# Patient Record
Sex: FEMALE | Race: ASIAN | NOT HISPANIC OR LATINO
[De-identification: names, ages, dates, MRNs, and addresses within clinical notes are randomized per-mention and may not be internally consistent; named-entity substitution may affect disease eponyms.]

---

## 2017-05-10 ENCOUNTER — APPOINTMENT (OUTPATIENT)
Dept: MAMMOGRAPHY | Facility: IMAGING CENTER | Age: 42
End: 2017-05-10

## 2017-05-10 ENCOUNTER — APPOINTMENT (OUTPATIENT)
Dept: ULTRASOUND IMAGING | Facility: IMAGING CENTER | Age: 42
End: 2017-05-10

## 2019-08-02 ENCOUNTER — EMERGENCY (EMERGENCY)
Facility: HOSPITAL | Age: 44
LOS: 1 days | Discharge: HOME | End: 2019-08-02
Attending: EMERGENCY MEDICINE | Admitting: EMERGENCY MEDICINE
Payer: COMMERCIAL

## 2019-08-02 VITALS
OXYGEN SATURATION: 100 % | SYSTOLIC BLOOD PRESSURE: 103 MMHG | RESPIRATION RATE: 17 BRPM | HEART RATE: 64 BPM | TEMPERATURE: 98 F | DIASTOLIC BLOOD PRESSURE: 71 MMHG

## 2019-08-02 VITALS
SYSTOLIC BLOOD PRESSURE: 129 MMHG | TEMPERATURE: 98 F | OXYGEN SATURATION: 100 % | RESPIRATION RATE: 18 BRPM | DIASTOLIC BLOOD PRESSURE: 67 MMHG | HEART RATE: 75 BPM

## 2019-08-02 LAB
APPEARANCE UR: CLEAR — SIGNIFICANT CHANGE UP
BILIRUB UR-MCNC: NEGATIVE — SIGNIFICANT CHANGE UP
BLOOD UR QL VISUAL: NEGATIVE — SIGNIFICANT CHANGE UP
COLOR SPEC: YELLOW — SIGNIFICANT CHANGE UP
GLUCOSE UR-MCNC: NEGATIVE — SIGNIFICANT CHANGE UP
KETONES UR-MCNC: NEGATIVE — SIGNIFICANT CHANGE UP
LEUKOCYTE ESTERASE UR-ACNC: NEGATIVE — SIGNIFICANT CHANGE UP
NITRITE UR-MCNC: NEGATIVE — SIGNIFICANT CHANGE UP
PH UR: 6.5 — SIGNIFICANT CHANGE UP (ref 5–8)
PROT UR-MCNC: NEGATIVE — SIGNIFICANT CHANGE UP
SP GR SPEC: 1.02 — SIGNIFICANT CHANGE UP (ref 1–1.04)
UROBILINOGEN FLD QL: NORMAL — SIGNIFICANT CHANGE UP

## 2019-08-02 PROCEDURE — 76830 TRANSVAGINAL US NON-OB: CPT | Mod: 26

## 2019-08-02 PROCEDURE — 99284 EMERGENCY DEPT VISIT MOD MDM: CPT

## 2019-08-02 NOTE — ED PROVIDER NOTE - PHYSICAL EXAMINATION
No CVA tenderness. No CVA tenderness.  GYN: mild uterine tenderness. no adnexal tenderness. scant clear/brown discharge. os closed. no cmt. Chaperoned by karen Do.

## 2019-08-02 NOTE — ED PROVIDER NOTE - CLINICAL SUMMARY MEDICAL DECISION MAKING FREE TEXT BOX
43 y/o F pt with PMHX of fibroids and ovarian cyst presents to the ED complaining of vaginal discomfort and intermittent pain for a week. Plan: lab pregnancy, UTI, check pelvic exam to r/o PID. Likely discomfort secondary to fibroid. will consider transvaginal US, and gyn followup.

## 2019-08-02 NOTE — ED ADULT TRIAGE NOTE - CHIEF COMPLAINT QUOTE
Complains of vaginal discharge and pelvic  and back pain. LMP 07/17/2019, reports vaginal discharge has not stopped since LMP. Pelvic and back pain x 1.5 weeks. Denies n/v fevers or chills, chest pain. Hx uterine fibroids and cysts.

## 2019-08-02 NOTE — ED ADULT NURSE NOTE - OBJECTIVE STATEMENT
Pt received a&ox3, c/o right flank pain x this am, denies urinary symptoms, denies hematuria/dysuria, pt denies hx of kidney stones/pylo, denies fever/chills/nvd, pt appears to be in NAD, 20 gauge IV placed in left ac, labs drawn and sent, MD nails performed.

## 2019-08-02 NOTE — ED PROVIDER NOTE - OBJECTIVE STATEMENT
43 y/o F pt with PMHX of fibroids and ovarian cyst presents to the ED complaining of vaginal discomfort and intermittent pain for a week. Pt also reports one week of back pain. Pt reports occasional abdominal cramping and brown vaginal discharge (LNMP: July 17). Pt denies n/v/d, fever, chills or any other medical problems.

## 2019-08-02 NOTE — ED PROVIDER NOTE - NSFOLLOWUPINSTRUCTIONS_ED_ALL_ED_FT
Please see your GYN doctor on Monday as scheduled. You may take ibuprofen and Tylenol for pain. Return to ED if you develop fever, flank pain, worsening pain or trouble urinating.

## 2019-08-02 NOTE — ED ADULT NURSE NOTE - NSIMPLEMENTINTERV_GEN_ALL_ED
Implemented All Universal Safety Interventions:  [place] to call system. Call bell, personal items and telephone within reach. Instruct patient to call for assistance. Room bathroom lighting operational. Non-slip footwear when patient is off stretcher. Physically safe environment: no spills, clutter or unnecessary equipment. Stretcher in lowest position, wheels locked, appropriate side rails in place.

## 2019-08-02 NOTE — ED PROVIDER NOTE - NS_ ATTENDINGSCRIBEDETAILS _ED_A_ED_FT
The scribe's documentation has been prepared under my direction and personally reviewed by me, Bharati Roa MD, in its entirety. I confirm that the note above accurately reflects all work, treatment, procedures, and medical decision making performed by me.

## 2019-08-03 LAB
BACTERIA UR CULT: SIGNIFICANT CHANGE UP
SPECIMEN SOURCE: SIGNIFICANT CHANGE UP

## 2020-02-16 ENCOUNTER — TRANSCRIPTION ENCOUNTER (OUTPATIENT)
Age: 45
End: 2020-02-16

## 2020-07-27 ENCOUNTER — TRANSCRIPTION ENCOUNTER (OUTPATIENT)
Age: 45
End: 2020-07-27

## 2022-06-12 ENCOUNTER — EMERGENCY (EMERGENCY)
Facility: HOSPITAL | Age: 47
LOS: 1 days | Discharge: HOME | End: 2022-06-12
Attending: EMERGENCY MEDICINE | Admitting: EMERGENCY MEDICINE
Payer: COMMERCIAL

## 2022-06-12 VITALS
OXYGEN SATURATION: 100 % | TEMPERATURE: 98 F | HEART RATE: 73 BPM | DIASTOLIC BLOOD PRESSURE: 76 MMHG | RESPIRATION RATE: 18 BRPM | SYSTOLIC BLOOD PRESSURE: 112 MMHG

## 2022-06-12 VITALS
SYSTOLIC BLOOD PRESSURE: 122 MMHG | OXYGEN SATURATION: 100 % | RESPIRATION RATE: 18 BRPM | HEART RATE: 78 BPM | DIASTOLIC BLOOD PRESSURE: 54 MMHG | TEMPERATURE: 97 F

## 2022-06-12 PROBLEM — D21.9 BENIGN NEOPLASM OF CONNECTIVE AND OTHER SOFT TISSUE, UNSPECIFIED: Chronic | Status: ACTIVE | Noted: 2019-08-02

## 2022-06-12 PROBLEM — N83.209 UNSPECIFIED OVARIAN CYST, UNSPECIFIED SIDE: Chronic | Status: ACTIVE | Noted: 2019-08-02

## 2022-06-12 LAB
ALBUMIN SERPL ELPH-MCNC: 4 G/DL — SIGNIFICANT CHANGE UP (ref 3.3–5)
ALP SERPL-CCNC: 50 U/L — SIGNIFICANT CHANGE UP (ref 40–120)
ALT FLD-CCNC: 25 U/L — SIGNIFICANT CHANGE UP (ref 4–33)
ANION GAP SERPL CALC-SCNC: 9 MMOL/L — SIGNIFICANT CHANGE UP (ref 7–14)
AST SERPL-CCNC: 39 U/L — HIGH (ref 4–32)
BASOPHILS # BLD AUTO: 0.01 K/UL — SIGNIFICANT CHANGE UP (ref 0–0.2)
BASOPHILS NFR BLD AUTO: 0.2 % — SIGNIFICANT CHANGE UP (ref 0–2)
BILIRUB SERPL-MCNC: 0.8 MG/DL — SIGNIFICANT CHANGE UP (ref 0.2–1.2)
BUN SERPL-MCNC: 15 MG/DL — SIGNIFICANT CHANGE UP (ref 7–23)
CALCIUM SERPL-MCNC: 8.9 MG/DL — SIGNIFICANT CHANGE UP (ref 8.4–10.5)
CHLORIDE SERPL-SCNC: 103 MMOL/L — SIGNIFICANT CHANGE UP (ref 98–107)
CO2 SERPL-SCNC: 24 MMOL/L — SIGNIFICANT CHANGE UP (ref 22–31)
CREAT SERPL-MCNC: 0.57 MG/DL — SIGNIFICANT CHANGE UP (ref 0.5–1.3)
EGFR: 113 ML/MIN/1.73M2 — SIGNIFICANT CHANGE UP
EOSINOPHIL # BLD AUTO: 0.05 K/UL — SIGNIFICANT CHANGE UP (ref 0–0.5)
EOSINOPHIL NFR BLD AUTO: 1 % — SIGNIFICANT CHANGE UP (ref 0–6)
GLUCOSE SERPL-MCNC: 97 MG/DL — SIGNIFICANT CHANGE UP (ref 70–99)
HCG UR QL: NEGATIVE — SIGNIFICANT CHANGE UP
HCT VFR BLD CALC: 40.8 % — SIGNIFICANT CHANGE UP (ref 34.5–45)
HGB BLD-MCNC: 13.5 G/DL — SIGNIFICANT CHANGE UP (ref 11.5–15.5)
IANC: 3.5 K/UL — SIGNIFICANT CHANGE UP (ref 1.8–7.4)
IMM GRANULOCYTES NFR BLD AUTO: 0.2 % — SIGNIFICANT CHANGE UP (ref 0–1.5)
LYMPHOCYTES # BLD AUTO: 0.91 K/UL — LOW (ref 1–3.3)
LYMPHOCYTES # BLD AUTO: 18.8 % — SIGNIFICANT CHANGE UP (ref 13–44)
MCHC RBC-ENTMCNC: 31.4 PG — SIGNIFICANT CHANGE UP (ref 27–34)
MCHC RBC-ENTMCNC: 33.1 GM/DL — SIGNIFICANT CHANGE UP (ref 32–36)
MCV RBC AUTO: 94.9 FL — SIGNIFICANT CHANGE UP (ref 80–100)
MONOCYTES # BLD AUTO: 0.36 K/UL — SIGNIFICANT CHANGE UP (ref 0–0.9)
MONOCYTES NFR BLD AUTO: 7.4 % — SIGNIFICANT CHANGE UP (ref 2–14)
NEUTROPHILS # BLD AUTO: 3.5 K/UL — SIGNIFICANT CHANGE UP (ref 1.8–7.4)
NEUTROPHILS NFR BLD AUTO: 72.4 % — SIGNIFICANT CHANGE UP (ref 43–77)
NRBC # BLD: 0 /100 WBCS — SIGNIFICANT CHANGE UP
NRBC # FLD: 0 K/UL — SIGNIFICANT CHANGE UP
PLATELET # BLD AUTO: 170 K/UL — SIGNIFICANT CHANGE UP (ref 150–400)
POTASSIUM SERPL-MCNC: 4.6 MMOL/L — SIGNIFICANT CHANGE UP (ref 3.5–5.3)
POTASSIUM SERPL-SCNC: 4.6 MMOL/L — SIGNIFICANT CHANGE UP (ref 3.5–5.3)
PROT SERPL-MCNC: 7.2 G/DL — SIGNIFICANT CHANGE UP (ref 6–8.3)
RBC # BLD: 4.3 M/UL — SIGNIFICANT CHANGE UP (ref 3.8–5.2)
RBC # FLD: 12 % — SIGNIFICANT CHANGE UP (ref 10.3–14.5)
SODIUM SERPL-SCNC: 136 MMOL/L — SIGNIFICANT CHANGE UP (ref 135–145)
WBC # BLD: 4.84 K/UL — SIGNIFICANT CHANGE UP (ref 3.8–10.5)
WBC # FLD AUTO: 4.84 K/UL — SIGNIFICANT CHANGE UP (ref 3.8–10.5)

## 2022-06-12 PROCEDURE — 99284 EMERGENCY DEPT VISIT MOD MDM: CPT

## 2022-06-12 RX ORDER — ACETAMINOPHEN 500 MG
975 TABLET ORAL ONCE
Refills: 0 | Status: COMPLETED | OUTPATIENT
Start: 2022-06-12 | End: 2022-06-12

## 2022-06-12 RX ADMIN — Medication 975 MILLIGRAM(S): at 08:40

## 2022-06-12 NOTE — ED ADULT TRIAGE NOTE - RESPIRATORY RATE (BREATHS/MIN)
Edison (pt's son) is calling because he has noticed that his mother is declining complains of increased memory loss, being thirsty a lot, tired and has has a couple of low blood pressure readings when seeing the physical therapist. He read some information that these symptoms may be related to her thyroid and is requesting to have some additional levels checked if okayed with Dr Elliott, Edison can be reached at [phone].   18

## 2022-06-12 NOTE — ED PROVIDER NOTE - CLINICAL SUMMARY MEDICAL DECISION MAKING FREE TEXT BOX
47F no PMH  presenting with 4 days of worsening swelling in L groin/labia associated with 2 days of fever 100.8F Tmax at home. Given hx and physical, ddx includes abscess vs cellulitis vs bartholin cyst. Plan for labs and possible drainage vs OB cs and reassess

## 2022-06-12 NOTE — ED PROVIDER NOTE - OBJECTIVE STATEMENT
47F no PMH  presenting with 4 days of worsening swelling in L groin/labia associated with 2 days of fever 100.8F Tmax at home. No associated discharge, dysuria, hematuria, n/v/d/c, abdominal pain, chest pain, sob, cough. Pt taking ibuprofen at home for pain last dose 7pm last night. Pt reports smaller abscess in past that resolved on its own without intervention but states it was not this bad. 47F no PMH  presenting with 4 days of worsening swelling in L groin/labia associated with 2 days of fever 100.8F Tmax at home. No associated discharge, dysuria, hematuria, n/v/d/c, abdominal pain, chest pain, sob, cough. Pt taking ibuprofen at home for pain last dose 7pm last night. Pt reports smaller abscess in past that resolved on its own without intervention but states it was not this bad.  Attending - Agree with above.  I evaluated patient myself. 48 y/o F c/o left groin abscess x 2 days associated with fever.  Reports similar abscesses in past but usually smaller and resolve spontaneously.  Denies vag bleeding or discharge.  No dysuria or hematuria.  No cough or recent covid.

## 2022-06-12 NOTE — CONSULT NOTE ADULT - ASSESSMENT
48 y/o  LMP 2 months ago presenting with left labial swelling and pain, likely vulvar cellulitis vs. folliculitis Low suspicion for bartholin gland cyst. No significant area of fluctuance amenable to drainage. Patient is afebrile and clinically stable.   - No acute GYN intervention at this time  - Augmentin 875mg BID for 5-7 days  - Sitz baths  - Pain control with Tylenol and Ibuprofen PRN  - f/u with outpatient GYN(pt trying to establish care with Dr. Gao)  - Return precautions given    Seen with Dr. Tan Anderson, PGY-2

## 2022-06-12 NOTE — ED PROVIDER NOTE - PROGRESS NOTE DETAILS
Tari Bates DO (PGY1): MARBELLA paged, awaiting response. Tari Bates DO (PGY1): Spoke with OBGYN, will come eval pt in ED. Dayana - OBGYDANYELLE nails and consult appreciated.  Felt to be mainly indurated without abscess for I&D at this time.  Will prescribe Augmentin x 10 days.  Patient to f/u with her OBGYN ASAP as discussed with patient.

## 2022-06-12 NOTE — ED ADULT TRIAGE NOTE - CHIEF COMPLAINT QUOTE
abscess    pt has abscess to left labia for a few days.  denies dysuria or discharge.  no pmhx.  had low grade fever (tmax 100.8).

## 2022-06-12 NOTE — CONSULT NOTE ADULT - SUBJECTIVE AND OBJECTIVE BOX
YIN ORTIZ  47y  Female [id number]    HPI:  46 y/o  LMP 2 months ago presenting with left labial swelling and pain. Patient states that she first noticed a small bump on her left labia approximately 2 weeks ago and 4 days ago it became more painful and enlarged She reports difficulty sitting and ambulating. She took a salt bath yesterday with some relief of her symptoms and took ibuprofen/tylenol with partial relief of her pain. Additionally she reports fevers with TMax of 101 3 days ago, no fevers yesterday or today. She denies any drainage from left labia, abnormal vaginal discharge, CP, SOB, cough, rhinorrhea, abdominal pain, N/V/D, urinary symptoms. Of note, patient had negative PCR and rapid test as an outpatient 2 days ago.     In the ED, patient with stable VS. Pain improved with Tylenol.     Name of GYN Physician: Previously Dr. Ahn    POB:  x2  Pgyn: +hx of  fibroids, denies cysts, endometriosis, STI's, Abnormal pap smears   PMH: denies  PSH: denies  Meds: none  All: NKDA  SH: Denies smoking use, drug use, alcohol us    Vital Signs Last 24 Hrs  T(C): 36.8 (2022 11:48), Max: 36.8 (2022 11:48)  T(F): 98.3 (2022 11:48), Max: 98.3 (2022 11:48)  HR: 73 (2022 11:48) (73 - 78)  BP: 112/76 (2022 11:48) (112/76 - 122/54)  BP(mean): --  RR: 18 (2022 11:48) (18 - 18)  SpO2: 100% (2022 11:48) (100% - 100%)    Physical Exam:   General: sitting comftorably in bed, NAD   CV: RRR  Lungs: CTA b/l  Abd: Soft, non-tender, non-distended.  : Right labia wnl. Left vulvar area erythematous, tender and edematous with 5x3 cm area of induration with small area of fluctuance. No warmth, no active drainage. No tracking into the vagina.   Ext: non-tender b/l, no edema     LABS:      Pregnancy Profile, Urine: Negative (22 @ 09:32)                          13.5   4.84  )-----------( 170      ( 2022 08:48 )             40.8         136  |  103  |  15  ----------------------------<  97  4.6   |  24  |  0.57    Ca    8.9      2022 08:48    TPro  7.2  /  Alb  4.0  /  TBili  0.8  /  DBili  x   /  AST  39<H>  /  ALT  25  /  AlkPhos  50      I&O's Detail          2

## 2022-06-12 NOTE — ED PROVIDER NOTE - NSFOLLOWUPINSTRUCTIONS_ED_ALL_ED_FT
Take Augmentin as prescribed.  Follow up with your doctor within one week.  Return to ER immediately for increased swelling, pain, or any further fever.

## 2022-06-12 NOTE — ED PROVIDER NOTE - PATIENT PORTAL LINK FT
You can access the FollowMyHealth Patient Portal offered by [facility] by registering at the following website: [email or web address]. By joining [employer]’s FollowMyHealth portal, you will also be able to view your health information using other applications (apps) compatible with our system.

## 2022-06-12 NOTE — ED PROVIDER NOTE - PHYSICAL EXAMINATION
GENERAL: Awake. Alert. NAD. Well nourished.  HEENT: NC/AT, Conjunctiva pink, no scleral icterus. Airway patent. Moist mucous membranes.  LUNGS: CTAB. No wheezes or rales noted.  CARDIAC: Chest non-tender to palpation. RRR.  ABDOMEN: No masses noted. Soft, NT, ND, no rebound, no guarding.  EXT: No edema, no calf tenderness, distal pulses 2+ bilaterally.  NEURO: A&Ox3. Moving all extremities. Sensation and strength intact throughout.   Pelvic Exam: Chaperoned by ED tech. External exam showing erythematous and indurated area at L groin and L labia majora, TTP, no active drainage noted.   SKIN: Warm and dry. No rash.  PSYCH: Normal affect. GENERAL: Awake. Alert. NAD. Well nourished.  HEENT: NC/AT, Conjunctiva pink, no scleral icterus. Airway patent. Moist mucous membranes.  LUNGS: CTAB. No wheezes or rales noted.  CARDIAC: Chest non-tender to palpation. RRR.  ABDOMEN: No masses noted. Soft, NT, ND, no rebound, no guarding.  EXT: No edema, no calf tenderness, distal pulses 2+ bilaterally.  NEURO: A&Ox3. Moving all extremities. Sensation and strength intact throughout.   Pelvic Exam: Chaperoned by ED tech. External exam showing erythematous and indurated area at L groin and L labia majora, TTP, no active drainage noted.   SKIN: Warm and dry. No rash.  PSYCH: Normal affect.  ATTENDING PHYSICAL EXAM  GEN - NAD; well appearing; A+O x3  NECK: Neck supple, non-tender without lymphadenopathy, no masses, no JVD  PULMONARY - CTA b/l, symmetric breath sounds, no w/r/r  CARDIAC -s1s2, RRR, no M,R,G  ABDOMEN - +NABS, ND, NT, soft, no guarding, no rebound, no masses   BACK - no CVA tenderness, No vertebral or paravertebral tenderness  EXTREMITIES - symmetric pulses, 2+ dp, capillary refill < 2 seconds, no clubbing, no cyanosis, no edema  Pelvic exam performed with Resident Paulo.  Noted large abscess lateral to labia majora with fluctuence and erythema.

## 2022-06-29 ENCOUNTER — NON-APPOINTMENT (OUTPATIENT)
Age: 47
End: 2022-06-29

## 2022-06-29 ENCOUNTER — ASOB RESULT (OUTPATIENT)
Age: 47
End: 2022-06-29

## 2022-06-29 ENCOUNTER — APPOINTMENT (OUTPATIENT)
Dept: OBGYN | Facility: CLINIC | Age: 47
End: 2022-06-29

## 2022-06-29 VITALS
DIASTOLIC BLOOD PRESSURE: 80 MMHG | HEIGHT: 66 IN | SYSTOLIC BLOOD PRESSURE: 125 MMHG | BODY MASS INDEX: 19.29 KG/M2 | WEIGHT: 120 LBS

## 2022-06-29 DIAGNOSIS — Z01.419 ENCOUNTER FOR GYNECOLOGICAL EXAMINATION (GENERAL) (ROUTINE) W/OUT ABNORMAL FINDINGS: ICD-10-CM

## 2022-06-29 DIAGNOSIS — N92.1 EXCESSIVE AND FREQUENT MENSTRUATION WITH IRREGULAR CYCLE: ICD-10-CM

## 2022-06-29 DIAGNOSIS — N83.209 UNSPECIFIED OVARIAN CYST, UNSPECIFIED SIDE: ICD-10-CM

## 2022-06-29 PROCEDURE — 82270 OCCULT BLOOD FECES: CPT

## 2022-06-29 PROCEDURE — 99386 PREV VISIT NEW AGE 40-64: CPT

## 2022-06-29 PROCEDURE — 76830 TRANSVAGINAL US NON-OB: CPT

## 2022-07-01 ENCOUNTER — NON-APPOINTMENT (OUTPATIENT)
Age: 47
End: 2022-07-01

## 2022-07-01 LAB — HPV HIGH+LOW RISK DNA PNL CVX: NOT DETECTED

## 2022-07-27 ENCOUNTER — APPOINTMENT (OUTPATIENT)
Dept: OBGYN | Facility: CLINIC | Age: 47
End: 2022-07-27

## 2022-07-28 LAB — CYTOLOGY CVX/VAG DOC THIN PREP: NORMAL

## 2024-07-25 ENCOUNTER — NON-APPOINTMENT (OUTPATIENT)
Age: 49
End: 2024-07-25

## 2024-08-04 ENCOUNTER — NON-APPOINTMENT (OUTPATIENT)
Age: 49
End: 2024-08-04

## 2024-08-23 NOTE — ED PROVIDER NOTE - PROGRESS NOTE DETAILS
Yes
UA and UCG negative. US with multiple fibroids and ovarian lesions. Pt has f/u with GYN on Monday. Will recommend NSAIDs and f/u with GYN. do not suspect torsion or PID.

## 2024-08-28 NOTE — ED ADULT NURSE NOTE - NSSUSCREENINGQ4_ED_ALL_ED
SEE BELOW FOR OUR NEW PHONE NUMBER!!!      Thanks for visiting us today!    Remember these important phone numbers:    [phone] for phone nurses during the day and our nurse answering service at night    [phone]   for scheduling or changing future appointments    [phone] for the Poison Control Center    When leaving a message for our staff, please include:   the spelling of your child’s full name and date of birth  your full name and relationship to child  best phone number and time to reach you   reason for the call      We strongly recommend that all children age 6 months and older receive the COVID-19 vaccine. Call our office at [phone]  to schedule.      Where's the best place on the internet to look up health information about kids?  HealthyChildren.org  From the [employer]        Is your child signed up for Pact? If you do this, you can message us rather than playing phone tag! You can also look at labs, pay your bills, and do some scheduling. Go to your own account first. (If you don't have one yet, you can set one up at the website below or at your doctor's office).  Using a web browser (not the phone russell), on the right hand side of your page, click the button marked \"Request Access to my Child's Records.\" Fill out the information. In a few days your child's information will be linked to your account. It's that simple!! Here is the website for more information:    [email or web address]        --------------------------------------------------------------------------------------------------------------------      
No